# Patient Record
Sex: MALE | Race: OTHER | Employment: UNEMPLOYED | URBAN - METROPOLITAN AREA
[De-identification: names, ages, dates, MRNs, and addresses within clinical notes are randomized per-mention and may not be internally consistent; named-entity substitution may affect disease eponyms.]

---

## 2018-02-12 ENCOUNTER — OFFICE VISIT (OUTPATIENT)
Dept: FAMILY MEDICINE CLINIC | Facility: CLINIC | Age: 23
End: 2018-02-12

## 2018-02-12 VITALS
TEMPERATURE: 98 F | SYSTOLIC BLOOD PRESSURE: 117 MMHG | DIASTOLIC BLOOD PRESSURE: 77 MMHG | HEART RATE: 79 BPM | RESPIRATION RATE: 18 BRPM | HEIGHT: 69 IN | BODY MASS INDEX: 20.93 KG/M2 | WEIGHT: 141.31 LBS

## 2018-02-12 DIAGNOSIS — N50.89 SCROTAL MASS: Primary | ICD-10-CM

## 2018-02-12 DIAGNOSIS — R04.0 EPISTAXIS: ICD-10-CM

## 2018-02-12 PROCEDURE — 99213 OFFICE O/P EST LOW 20 MIN: CPT | Performed by: FAMILY MEDICINE

## 2018-02-12 PROCEDURE — 99212 OFFICE O/P EST SF 10 MIN: CPT | Performed by: FAMILY MEDICINE

## 2018-02-12 NOTE — PROGRESS NOTES
Rt sided scrotal lump 1 mo  No pain   No fever  No buring with urination      Get bloody noses a lot. Exam  Nose normal  Possible Scrotum nodule base of rt testicle.      A/p  Epistaxis ayr  Scrotal mass  u/s

## 2018-02-16 ENCOUNTER — HOSPITAL ENCOUNTER (OUTPATIENT)
Dept: ULTRASOUND IMAGING | Age: 23
Discharge: HOME | End: 2018-02-16
Attending: FAMILY MEDICINE
Payer: COMMERCIAL

## 2018-02-16 DIAGNOSIS — N50.89 SCROTAL MASS: ICD-10-CM

## 2018-02-16 PROCEDURE — 76870 US EXAM SCROTUM: CPT | Performed by: FAMILY MEDICINE

## 2018-02-16 PROCEDURE — 93975 VASCULAR STUDY: CPT | Performed by: FAMILY MEDICINE

## 2018-06-01 ENCOUNTER — OFFICE VISIT (OUTPATIENT)
Dept: FAMILY MEDICINE CLINIC | Facility: CLINIC | Age: 23
End: 2018-06-01

## 2018-06-01 VITALS
DIASTOLIC BLOOD PRESSURE: 76 MMHG | HEART RATE: 80 BPM | WEIGHT: 142 LBS | SYSTOLIC BLOOD PRESSURE: 118 MMHG | BODY MASS INDEX: 21.03 KG/M2 | HEIGHT: 69 IN

## 2018-06-01 DIAGNOSIS — F41.8 PERFORMANCE ANXIETY: ICD-10-CM

## 2018-06-01 DIAGNOSIS — R04.0 EPISTAXIS, RECURRENT: ICD-10-CM

## 2018-06-01 DIAGNOSIS — Z00.00 ROUTINE PHYSICAL EXAMINATION: Primary | ICD-10-CM

## 2018-06-01 PROCEDURE — 90471 IMMUNIZATION ADMIN: CPT | Performed by: FAMILY MEDICINE

## 2018-06-01 PROCEDURE — 90715 TDAP VACCINE 7 YRS/> IM: CPT | Performed by: FAMILY MEDICINE

## 2018-06-01 PROCEDURE — 99395 PREV VISIT EST AGE 18-39: CPT | Performed by: FAMILY MEDICINE

## 2018-06-01 RX ORDER — PROPRANOLOL HYDROCHLORIDE 40 MG/1
40 TABLET ORAL
Qty: 30 TABLET | Refills: 0 | Status: SHIPPED | OUTPATIENT
Start: 2018-06-01 | End: 2020-09-03

## 2018-06-01 NOTE — PROGRESS NOTES
Blood pressure 118/76, pulse 80, height 5' 9\" (1.753 m), weight 142 lb (64.4 kg). REASON FOR VISIT:    Kyrie Huffman is a 25year old male who presents for an [address]. There is no problem list on file for this patient.     Gene current outpatient prescriptions on file. MEDICAL INFORMATION:   History reviewed. No pertinent past medical history.    Past Surgical History:  No date: TONSILLECTOMY   Family History   Problem Relation Age of Onset   • Heart Disorder Maternal Grandmothe nerves are intact, motor and sensory are grossly intact      ASSESSMENT AND OTHER RELEVANT CHRONIC CONDITIONS:   Jenn Sinha is a 25year old male who presents for an [address].      PLAN SUMMARY:   There are no diagnoses linked to this e

## 2018-06-08 ENCOUNTER — LAB ENCOUNTER (OUTPATIENT)
Dept: LAB | Age: 23
End: 2018-06-08
Attending: FAMILY MEDICINE
Payer: COMMERCIAL

## 2018-06-08 DIAGNOSIS — Z00.00 ROUTINE PHYSICAL EXAMINATION: ICD-10-CM

## 2018-06-08 PROCEDURE — 80500 HEPATITIS A B + C PROFILE: CPT

## 2018-06-08 PROCEDURE — 36415 COLL VENOUS BLD VENIPUNCTURE: CPT

## 2018-06-08 PROCEDURE — 86803 HEPATITIS C AB TEST: CPT

## 2018-06-08 PROCEDURE — 82947 ASSAY GLUCOSE BLOOD QUANT: CPT

## 2018-06-08 PROCEDURE — 86709 HEPATITIS A IGM ANTIBODY: CPT

## 2018-06-08 PROCEDURE — 86706 HEP B SURFACE ANTIBODY: CPT

## 2018-06-08 PROCEDURE — 80061 LIPID PANEL: CPT

## 2018-06-08 PROCEDURE — 87389 HIV-1 AG W/HIV-1&-2 AB AG IA: CPT

## 2018-06-08 PROCEDURE — 86708 HEPATITIS A ANTIBODY: CPT

## 2018-06-08 PROCEDURE — 87340 HEPATITIS B SURFACE AG IA: CPT

## 2018-06-08 PROCEDURE — 86780 TREPONEMA PALLIDUM: CPT

## 2018-06-08 PROCEDURE — 87491 CHLMYD TRACH DNA AMP PROBE: CPT

## 2018-06-08 PROCEDURE — 86704 HEP B CORE ANTIBODY TOTAL: CPT

## 2018-06-08 PROCEDURE — 87591 N.GONORRHOEAE DNA AMP PROB: CPT

## 2018-06-11 ENCOUNTER — OFFICE VISIT (OUTPATIENT)
Dept: OTOLARYNGOLOGY | Facility: CLINIC | Age: 23
End: 2018-06-11

## 2018-06-11 VITALS
DIASTOLIC BLOOD PRESSURE: 76 MMHG | BODY MASS INDEX: 20.04 KG/M2 | WEIGHT: 140 LBS | HEIGHT: 70 IN | TEMPERATURE: 98 F | SYSTOLIC BLOOD PRESSURE: 118 MMHG

## 2018-06-11 DIAGNOSIS — R04.0 EPISTAXIS: Primary | ICD-10-CM

## 2018-06-11 PROCEDURE — 99212 OFFICE O/P EST SF 10 MIN: CPT | Performed by: OTOLARYNGOLOGY

## 2018-06-11 PROCEDURE — 30901 CONTROL OF NOSEBLEED: CPT | Performed by: OTOLARYNGOLOGY

## 2018-06-11 PROCEDURE — 99243 OFF/OP CNSLTJ NEW/EST LOW 30: CPT | Performed by: OTOLARYNGOLOGY

## 2018-06-11 NOTE — PROGRESS NOTES
Kelechi Alexander is a 25year old male. Patient presents with:  Nose Problem: nosebleeds from left nostril usually in the winter season     HPI:   Recurrent episodes of bleeding usually from the left side of the nose.      Current Outpatient Prescriptions: Posterior cervical. Supraclavicular.    Eyes Normal Conjunctiva - Right: Normal, Left: Normal. Pupil - Right: Normal, Left: Normal.    Ears Normal Inspection - Right: Normal, Left: Normal. Canal - Left: Normal. TM - Right: Normal, Left: Normal.     Procedur

## 2018-08-27 ENCOUNTER — HOSPITAL ENCOUNTER (OUTPATIENT)
Age: 23
Discharge: HOME | End: 2018-08-27
Attending: EMERGENCY MEDICINE
Payer: COMMERCIAL

## 2018-08-27 ENCOUNTER — NURSE TRIAGE (OUTPATIENT)
Dept: OTHER | Age: 23
End: 2018-08-27

## 2018-08-27 ENCOUNTER — APPOINTMENT (OUTPATIENT)
Dept: GENERAL RADIOLOGY | Age: 23
End: 2018-08-27
Attending: EMERGENCY MEDICINE
Payer: COMMERCIAL

## 2018-08-27 VITALS
TEMPERATURE: 99 F | WEIGHT: 135 LBS | RESPIRATION RATE: 20 BRPM | BODY MASS INDEX: 19.99 KG/M2 | HEART RATE: 78 BPM | HEIGHT: 69 IN | DIASTOLIC BLOOD PRESSURE: 66 MMHG | OXYGEN SATURATION: 100 % | SYSTOLIC BLOOD PRESSURE: 112 MMHG

## 2018-08-27 DIAGNOSIS — S93.402A MODERATE LEFT ANKLE SPRAIN, INITIAL ENCOUNTER: Primary | ICD-10-CM

## 2018-08-27 PROCEDURE — 99213 OFFICE O/P EST LOW 20 MIN: CPT

## 2018-08-27 PROCEDURE — 99203 OFFICE O/P NEW LOW 30 MIN: CPT

## 2018-08-27 PROCEDURE — 73610 X-RAY EXAM OF ANKLE: CPT | Performed by: EMERGENCY MEDICINE

## 2018-08-27 NOTE — TELEPHONE ENCOUNTER
Action Requested: Summary for Provider     []  Critical Lab, Recommendations Needed  [x] Need Additional Advice  []   FYI    []   Need Orders  [] Need Medications Sent to Pharmacy  []  Other     SUMMARY: Per pt left ankle injury x 1 day. Pain 8/10.  Radha Hebert

## 2018-08-27 NOTE — ED PROVIDER NOTES
Patient Seen in: [address]    History   Patient presents with:   Ankle Pain    Stated Complaint: ANKLE PAIN    HPI    The patient is a 51-year-old male without significant past medical history, \"rolled\" his left ankle a 1041  ------------------------------------------------------------      MDM     Patient has a moderate left ankle sprain. Will treat with immobilization and rest and have the patient follow-up with PMD for repeat evaluation and further treatment.

## 2018-11-13 ENCOUNTER — PATIENT MESSAGE (OUTPATIENT)
Dept: FAMILY MEDICINE CLINIC | Facility: CLINIC | Age: 23
End: 2018-11-13

## 2018-11-14 NOTE — TELEPHONE ENCOUNTER
From: Lu Allen  To: Henri Price DO  Sent: 11/13/2018 9:29 PM CST  Subject: Non-Urgent Isidro Palumbo,    I have recently been invited to serve in the [employer], but I still have to be medically cleared for living con

## 2018-12-13 ENCOUNTER — OFFICE VISIT (OUTPATIENT)
Dept: FAMILY MEDICINE CLINIC | Facility: CLINIC | Age: 23
End: 2018-12-13

## 2018-12-13 VITALS
WEIGHT: 136.5 LBS | SYSTOLIC BLOOD PRESSURE: 99 MMHG | DIASTOLIC BLOOD PRESSURE: 66 MMHG | HEART RATE: 65 BPM | HEIGHT: 70 IN | BODY MASS INDEX: 19.54 KG/M2

## 2018-12-13 DIAGNOSIS — Z02.1 PHYSICAL EXAM, PRE-EMPLOYMENT: Primary | ICD-10-CM

## 2018-12-13 PROCEDURE — 99395 PREV VISIT EST AGE 18-39: CPT | Performed by: FAMILY MEDICINE

## 2018-12-13 NOTE — PROGRESS NOTES
Blood pressure 99/66, pulse 65, height 5' 10\" (1.778 m), weight 136 lb 8 oz (61.9 kg). REASON FOR VISIT:    Albania Landeros is a 21year old male who presents for an [address].         Patient Active Problem List:     Physical exam, pre-e Allergies  CURRENT MEDICATIONS:     Current Outpatient Medications:  Propranolol HCl 40 MG Oral Tab Take 1 tablet (40 mg total) by mouth daily as needed.  Disp: 30 tablet Rfl: 0      MEDICAL INFORMATION:   Past Medical History:   Diagnosis Date   • Anxiety deferred  MUSCULOSKELETAL: back is not tender, FROM of the back  EXTREMITIES: no cyanosis, clubbing or edema  NEURO: Oriented times three, cranial nerves are intact, motor and sensory are grossly intact      ASSESSMENT AND OTHER RELEVANT CHRONIC CONDITIONS Future  - POLIOVIRUS (TYPES 1, 3) ANTIBODIES;  Future  - VARICELLA ZOSTER, IGG; Future  - DME GENERIC NO CHARGE PRINTABLE

## 2018-12-17 ENCOUNTER — LAB ENCOUNTER (OUTPATIENT)
Dept: LAB | Age: 23
End: 2018-12-17
Attending: FAMILY MEDICINE
Payer: COMMERCIAL

## 2018-12-17 DIAGNOSIS — Z02.1 PHYSICAL EXAM, PRE-EMPLOYMENT: ICD-10-CM

## 2018-12-17 PROCEDURE — 82955 ASSAY OF G6PD ENZYME: CPT

## 2018-12-17 PROCEDURE — 80048 BASIC METABOLIC PNL TOTAL CA: CPT

## 2018-12-17 PROCEDURE — 87340 HEPATITIS B SURFACE AG IA: CPT

## 2018-12-17 PROCEDURE — 86787 VARICELLA-ZOSTER ANTIBODY: CPT

## 2018-12-17 PROCEDURE — 86706 HEP B SURFACE ANTIBODY: CPT

## 2018-12-17 PROCEDURE — 85025 COMPLETE CBC W/AUTO DIFF WBC: CPT

## 2018-12-17 PROCEDURE — 86658 ENTEROVIRUS ANTIBODY: CPT

## 2018-12-17 PROCEDURE — 86803 HEPATITIS C AB TEST: CPT

## 2018-12-17 PROCEDURE — 87389 HIV-1 AG W/HIV-1&-2 AB AG IA: CPT

## 2018-12-17 PROCEDURE — 36415 COLL VENOUS BLD VENIPUNCTURE: CPT

## 2018-12-17 PROCEDURE — 86480 TB TEST CELL IMMUN MEASURE: CPT

## 2019-01-09 ENCOUNTER — PATIENT MESSAGE (OUTPATIENT)
Dept: FAMILY MEDICINE CLINIC | Facility: CLINIC | Age: 24
End: 2019-01-09

## 2019-01-09 NOTE — TELEPHONE ENCOUNTER
From: Kelechi Alexander  To: Rachna Muro,   Sent: 1/9/2019 11:27 AM CST  Subject: Test Results Question    Hello,    I went and got my lab work done and everything looks good, but I haven't gotten my results for the HIV and HEP tests.  Do you know if

## 2019-02-05 ENCOUNTER — PATIENT MESSAGE (OUTPATIENT)
Dept: FAMILY MEDICINE CLINIC | Facility: CLINIC | Age: 24
End: 2019-02-05

## 2019-02-06 NOTE — TELEPHONE ENCOUNTER
Patient advised to call Medical Records at [phone] for further assistance, our record does not show any childhood series of TDP.     From: Paty Ovalles  To: Tiffanie Lewis DO  Sent: 2/5/2019 10:30 PM CST  Subject: Non-Urgent Medical Question

## 2020-09-03 ENCOUNTER — TELEPHONE (OUTPATIENT)
Dept: FAMILY MEDICINE CLINIC | Facility: CLINIC | Age: 25
End: 2020-09-03

## 2020-09-03 ENCOUNTER — OFFICE VISIT (OUTPATIENT)
Dept: FAMILY MEDICINE CLINIC | Facility: CLINIC | Age: 25
End: 2020-09-03

## 2020-09-03 VITALS
HEIGHT: 70 IN | SYSTOLIC BLOOD PRESSURE: 122 MMHG | HEART RATE: 70 BPM | DIASTOLIC BLOOD PRESSURE: 76 MMHG | BODY MASS INDEX: 20.49 KG/M2 | WEIGHT: 143.13 LBS

## 2020-09-03 DIAGNOSIS — Z78.9 HX OF FOREIGN TRAVEL: ICD-10-CM

## 2020-09-03 DIAGNOSIS — Z00.00 ROUTINE PHYSICAL EXAMINATION: Primary | ICD-10-CM

## 2020-09-03 PROCEDURE — 3008F BODY MASS INDEX DOCD: CPT | Performed by: FAMILY MEDICINE

## 2020-09-03 PROCEDURE — 3078F DIAST BP <80 MM HG: CPT | Performed by: FAMILY MEDICINE

## 2020-09-03 PROCEDURE — 99395 PREV VISIT EST AGE 18-39: CPT | Performed by: FAMILY MEDICINE

## 2020-09-03 PROCEDURE — 99213 OFFICE O/P EST LOW 20 MIN: CPT | Performed by: FAMILY MEDICINE

## 2020-09-03 PROCEDURE — 3074F SYST BP LT 130 MM HG: CPT | Performed by: FAMILY MEDICINE

## 2020-09-03 NOTE — PROGRESS NOTES
REASON FOR VISIT:    Ana Laura Sanchez is a 25year old male who presents for an [address]. Patient just returned from [place]. Was working with [employer] requesting STI testing and a vasectomy.     Patient Active Problem List:     Nikki Known Allergies  CURRENT MEDICATIONS:   No current outpatient medications on file.       MEDICAL INFORMATION:   Past Medical History:   Diagnosis Date   • Anxiety       Past Surgical History:   Procedure Laterality Date   • TONSILLECTOMY        Family Histo not tender, FROM of the back  EXTREMITIES: no cyanosis, clubbing or edema  NEURO: Oriented times three, cranial nerves are intact, motor and sensory are grossly intact      ASSESSMENT AND OTHER RELEVANT CHRONIC CONDITIONS:   Effie Melissa is a 25 year ol

## 2020-09-04 ENCOUNTER — LAB ENCOUNTER (OUTPATIENT)
Dept: LAB | Age: 25
End: 2020-09-04
Attending: FAMILY MEDICINE
Payer: COMMERCIAL

## 2020-09-04 DIAGNOSIS — Z00.00 ROUTINE PHYSICAL EXAMINATION: ICD-10-CM

## 2020-09-04 DIAGNOSIS — Z78.9 HX OF FOREIGN TRAVEL: ICD-10-CM

## 2020-09-04 LAB
BASOPHILS # BLD AUTO: 0.04 X10(3) UL (ref 0–0.2)
BASOPHILS NFR BLD AUTO: 0.8 %
BILIRUB UR QL: NEGATIVE
C TRACH DNA SPEC QL NAA+PROBE: NEGATIVE
CLARITY UR: CLEAR
COLOR UR: YELLOW
DEPRECATED RDW RBC AUTO: 38.5 FL (ref 35.1–46.3)
EOSINOPHIL # BLD AUTO: 0.04 X10(3) UL (ref 0–0.7)
EOSINOPHIL NFR BLD AUTO: 0.8 %
ERYTHROCYTE [DISTWIDTH] IN BLOOD BY AUTOMATED COUNT: 11.9 % (ref 11–15)
GLUCOSE UR-MCNC: NEGATIVE MG/DL
HBV SURFACE AG SER-ACNC: <0.1 [IU]/L
HBV SURFACE AG SERPL QL IA: NONREACTIVE
HCT VFR BLD AUTO: 48.8 % (ref 39–53)
HCV AB SERPL QL IA: NONREACTIVE
HGB BLD-MCNC: 16.8 G/DL (ref 13–17.5)
HGB UR QL STRIP.AUTO: NEGATIVE
IMM GRANULOCYTES # BLD AUTO: 0 X10(3) UL (ref 0–1)
IMM GRANULOCYTES NFR BLD: 0 %
KETONES UR-MCNC: NEGATIVE MG/DL
LEUKOCYTE ESTERASE UR QL STRIP.AUTO: NEGATIVE
LYMPHOCYTES # BLD AUTO: 1.91 X10(3) UL (ref 1–4)
LYMPHOCYTES NFR BLD AUTO: 40.2 %
MCH RBC QN AUTO: 30.2 PG (ref 26–34)
MCHC RBC AUTO-ENTMCNC: 34.4 G/DL (ref 31–37)
MCV RBC AUTO: 87.6 FL (ref 80–100)
MONOCYTES # BLD AUTO: 0.46 X10(3) UL (ref 0.1–1)
MONOCYTES NFR BLD AUTO: 9.7 %
N GONORRHOEA DNA SPEC QL NAA+PROBE: NEGATIVE
NEUTROPHILS # BLD AUTO: 2.3 X10 (3) UL (ref 1.5–7.7)
NEUTROPHILS # BLD AUTO: 2.3 X10(3) UL (ref 1.5–7.7)
NEUTROPHILS NFR BLD AUTO: 48.5 %
NITRITE UR QL STRIP.AUTO: NEGATIVE
PH UR: 7 [PH] (ref 5–8)
PLATELET # BLD AUTO: 184 10(3)UL (ref 150–450)
PROT UR-MCNC: NEGATIVE MG/DL
RBC # BLD AUTO: 5.57 X10(6)UL (ref 4.3–5.7)
SARS-COV-2 IGG SERPLBLD QL IA.RAPID: NEGATIVE
SP GR UR STRIP: 1.01 (ref 1–1.03)
UROBILINOGEN UR STRIP-ACNC: <2
WBC # BLD AUTO: 4.8 X10(3) UL (ref 4–11)

## 2020-09-04 PROCEDURE — 87591 N.GONORRHOEAE DNA AMP PROB: CPT

## 2020-09-04 PROCEDURE — 86803 HEPATITIS C AB TEST: CPT

## 2020-09-04 PROCEDURE — 86769 SARS-COV-2 COVID-19 ANTIBODY: CPT

## 2020-09-04 PROCEDURE — 86480 TB TEST CELL IMMUN MEASURE: CPT

## 2020-09-04 PROCEDURE — 85025 COMPLETE CBC W/AUTO DIFF WBC: CPT

## 2020-09-04 PROCEDURE — 87491 CHLMYD TRACH DNA AMP PROBE: CPT

## 2020-09-04 PROCEDURE — 87389 HIV-1 AG W/HIV-1&-2 AB AG IA: CPT

## 2020-09-04 PROCEDURE — 36415 COLL VENOUS BLD VENIPUNCTURE: CPT

## 2020-09-04 PROCEDURE — 81003 URINALYSIS AUTO W/O SCOPE: CPT

## 2020-09-04 PROCEDURE — 87340 HEPATITIS B SURFACE AG IA: CPT

## 2020-09-04 PROCEDURE — 86780 TREPONEMA PALLIDUM: CPT

## 2020-09-06 LAB
M TB IFN-G CD4+ T-CELLS BLD-ACNC: 0.02 IU/ML
M TB TUBERC IFN-G BLD QL: NEGATIVE
M TB TUBERC IGNF/MITOGEN IGNF CONTROL: 9.2 IU/ML
QUANTIFERON TB1 MINUS NIL: 0 IU/ML
QUANTIFERON TB2 MINUS NIL: 0 IU/ML

## 2020-09-07 LAB — T PALLIDUM AB SER QL: NEGATIVE

## 2020-09-21 ENCOUNTER — TELEPHONE (OUTPATIENT)
Dept: FAMILY MEDICINE CLINIC | Facility: CLINIC | Age: 25
End: 2020-09-21

## 2020-12-23 ENCOUNTER — LAB ENCOUNTER (OUTPATIENT)
Dept: LAB | Age: 25
End: 2020-12-23
Attending: FAMILY MEDICINE
Payer: COMMERCIAL

## 2020-12-23 DIAGNOSIS — Z11.3 SCREEN FOR STD (SEXUALLY TRANSMITTED DISEASE): ICD-10-CM

## 2020-12-23 PROCEDURE — 87591 N.GONORRHOEAE DNA AMP PROB: CPT

## 2020-12-23 PROCEDURE — 86780 TREPONEMA PALLIDUM: CPT

## 2020-12-23 PROCEDURE — 87491 CHLMYD TRACH DNA AMP PROBE: CPT

## 2020-12-23 PROCEDURE — 87389 HIV-1 AG W/HIV-1&-2 AB AG IA: CPT

## 2020-12-23 PROCEDURE — 87340 HEPATITIS B SURFACE AG IA: CPT

## 2020-12-23 PROCEDURE — 36415 COLL VENOUS BLD VENIPUNCTURE: CPT

## 2020-12-23 PROCEDURE — 86803 HEPATITIS C AB TEST: CPT

## 2021-03-03 ENCOUNTER — NURSE TRIAGE (OUTPATIENT)
Dept: FAMILY MEDICINE CLINIC | Facility: CLINIC | Age: 26
End: 2021-03-03

## 2021-03-03 NOTE — TELEPHONE ENCOUNTER
Action Requested: Summary for Provider     []  Critical Lab, Recommendations Needed  [] Need Additional Advice  []   FYI    []   Need Orders  [] Need Medications Sent to Pharmacy  []  Other     SUMMARY: patient has an appointment for 3/4/21.     Reports anx

## 2021-03-04 ENCOUNTER — TELEMEDICINE (OUTPATIENT)
Dept: FAMILY MEDICINE CLINIC | Facility: CLINIC | Age: 26
End: 2021-03-04

## 2021-03-04 DIAGNOSIS — F32.A DEPRESSION, UNSPECIFIED DEPRESSION TYPE: Primary | ICD-10-CM

## 2021-03-04 PROCEDURE — 99213 OFFICE O/P EST LOW 20 MIN: CPT | Performed by: FAMILY MEDICINE

## 2021-03-04 RX ORDER — ESCITALOPRAM OXALATE 10 MG/1
10 TABLET ORAL DAILY
Qty: 30 TABLET | Refills: 2 | Status: SHIPPED | OUTPATIENT
Start: 2021-03-04 | End: 2021-05-18

## 2021-03-04 NOTE — PROGRESS NOTES
There were no vitals taken for this visit. VIDEO VISIT      Patient presents today complaining of depression. Reports he feels he has always had seasonal affective disorder. He denies any suicidal intent. He does not abuse drugs.   He is currently not

## 2021-03-11 ENCOUNTER — TELEMEDICINE (OUTPATIENT)
Dept: FAMILY MEDICINE CLINIC | Facility: CLINIC | Age: 26
End: 2021-03-11

## 2021-03-11 DIAGNOSIS — F32.A DEPRESSION, UNSPECIFIED DEPRESSION TYPE: Primary | ICD-10-CM

## 2021-03-11 PROCEDURE — 99213 OFFICE O/P EST LOW 20 MIN: CPT | Performed by: FAMILY MEDICINE

## 2021-03-11 NOTE — PROGRESS NOTES
VIDEO VISIT    Patient presents today following up for depression. Sleeping well. Had headaches initially with the medication but none at this time. Some erectile dysfunction. Has noticed some yawning. No suicidal intent. Does not use drugs.   Denies

## 2021-03-14 ENCOUNTER — APPOINTMENT (OUTPATIENT)
Dept: GENERAL RADIOLOGY | Facility: HOSPITAL | Age: 26
End: 2021-03-14
Attending: EMERGENCY MEDICINE
Payer: COMMERCIAL

## 2021-03-14 ENCOUNTER — HOSPITAL ENCOUNTER (EMERGENCY)
Facility: HOSPITAL | Age: 26
Discharge: HOME | End: 2021-03-14
Attending: EMERGENCY MEDICINE
Payer: COMMERCIAL

## 2021-03-14 VITALS
OXYGEN SATURATION: 98 % | HEART RATE: 67 BPM | TEMPERATURE: 98 F | DIASTOLIC BLOOD PRESSURE: 97 MMHG | RESPIRATION RATE: 18 BRPM | HEIGHT: 69 IN | WEIGHT: 135 LBS | SYSTOLIC BLOOD PRESSURE: 127 MMHG | BODY MASS INDEX: 19.99 KG/M2

## 2021-03-14 DIAGNOSIS — S59.902A INJURY OF LEFT ELBOW, INITIAL ENCOUNTER: Primary | ICD-10-CM

## 2021-03-14 PROCEDURE — 29125 APPL SHORT ARM SPLINT STATIC: CPT

## 2021-03-14 PROCEDURE — 99283 EMERGENCY DEPT VISIT LOW MDM: CPT

## 2021-03-14 PROCEDURE — 73080 X-RAY EXAM OF ELBOW: CPT | Performed by: EMERGENCY MEDICINE

## 2021-03-14 RX ORDER — IBUPROFEN 600 MG/1
600 TABLET ORAL ONCE
Status: COMPLETED | OUTPATIENT
Start: 2021-03-14 | End: 2021-03-14

## 2021-03-14 NOTE — ED PROVIDER NOTES
Patient Seen in: [facility]      History   Patient presents with:  Elbow Injury    Stated Complaint: left elbow injury    HPI/Subjective:   HPI    31-year-old male with history of anxiety presents with complaints of left elbow shoulder tenderness noted. No distal forearm, wrist, or hand tenderness noted. Bilateral radial pulses intact and symmetric. Capillary refill less than 2 seconds to the fingertips.   No other joint tenderness is noted to palpation or range of motion of t

## 2021-03-14 NOTE — ED INITIAL ASSESSMENT (HPI)
Patient complaining of left elbow pain after a fall yesterday while skateboarding. States he had full range of motion immediately after the fall but now feels \"it has tightened up. \"

## 2021-03-15 ENCOUNTER — TELEMEDICINE (OUTPATIENT)
Dept: FAMILY MEDICINE CLINIC | Facility: CLINIC | Age: 26
End: 2021-03-15

## 2021-03-15 DIAGNOSIS — S59.909D ELBOW INJURY, UNSPECIFIED LATERALITY, SUBSEQUENT ENCOUNTER: Primary | ICD-10-CM

## 2021-03-15 PROBLEM — Z02.1 PHYSICAL EXAM, PRE-EMPLOYMENT: Status: RESOLVED | Noted: 2018-06-01 | Resolved: 2021-03-15

## 2021-03-15 PROBLEM — F33.1 MAJOR DEPRESSIVE DISORDER, RECURRENT, MODERATE (HCC): Status: ACTIVE | Noted: 2021-03-15

## 2021-03-15 PROCEDURE — 99213 OFFICE O/P EST LOW 20 MIN: CPT | Performed by: FAMILY MEDICINE

## 2021-03-15 RX ORDER — HYDROCODONE BITARTRATE AND ACETAMINOPHEN 5; 325 MG/1; MG/1
1 TABLET ORAL EVERY 6 HOURS PRN
Qty: 20 TABLET | Refills: 0 | Status: SHIPPED | OUTPATIENT
Start: 2021-03-15 | End: 2021-03-24

## 2021-03-15 NOTE — PROGRESS NOTES
VIDEO VISIT    Left elbow injury. Fell while skateboarding 2 days ago. Went to the ER yesterday. No pain medication was given. X-rays were nondiagnostic.     Objective large splint noted left upper extremity    Patient able to spread fingers apart    Ab

## 2021-03-17 ENCOUNTER — TELEPHONE (OUTPATIENT)
Dept: FAMILY MEDICINE CLINIC | Facility: CLINIC | Age: 26
End: 2021-03-17

## 2021-03-17 NOTE — TELEPHONE ENCOUNTER
Patient is requesting a referral for an orthopedic per his visit with Dr. Rut Poon on 3/15, please advise

## 2021-03-18 ENCOUNTER — TELEMEDICINE (OUTPATIENT)
Dept: FAMILY MEDICINE CLINIC | Facility: CLINIC | Age: 26
End: 2021-03-18

## 2021-03-18 DIAGNOSIS — S49.92XD ARM INJURY, LEFT, SUBSEQUENT ENCOUNTER: Primary | ICD-10-CM

## 2021-03-18 PROCEDURE — 99213 OFFICE O/P EST LOW 20 MIN: CPT | Performed by: FAMILY MEDICINE

## 2021-03-18 NOTE — PROGRESS NOTES
VIDEO VISIT    Patient presents today following up for depression. No recent panic attacks no suicidal thoughts in the past several days. Feels better on the medication sleeping better at night. Pain is diminished in the left arm.     Patient has met wit

## 2021-03-24 ENCOUNTER — OFFICE VISIT (OUTPATIENT)
Dept: ORTHOPEDICS CLINIC | Facility: CLINIC | Age: 26
End: 2021-03-24

## 2021-03-24 VITALS — BODY MASS INDEX: 19.99 KG/M2 | HEIGHT: 69 IN | WEIGHT: 135 LBS

## 2021-03-24 DIAGNOSIS — M25.422 EFFUSION, LEFT ELBOW: Primary | ICD-10-CM

## 2021-03-24 PROCEDURE — 3008F BODY MASS INDEX DOCD: CPT | Performed by: ORTHOPAEDIC SURGERY

## 2021-03-24 PROCEDURE — 99243 OFF/OP CNSLTJ NEW/EST LOW 30: CPT | Performed by: ORTHOPAEDIC SURGERY

## 2021-03-24 NOTE — PROGRESS NOTES
NURSING INTAKE COMMENTS: Patient presents with:  Consult: left arm injury- pt states he fell off his skateboard on 3/13/21 and fell on his wrist. pt states his pain is in the forearm and elbow.  pt went to [facility] and had XR of elbow on 3/14/21      HPI: This 25 nasal congestion, sinus pain or ST  LUNGS: denies shortness of breath  CARDIOVASCULAR: denies chest pain  GI: no hematemesis, no worsening heartburn, no diarrhea  : no dysuria, no blood in urine, no difficulty urinating, no incontinence  MUSCULOSKELETAL: 184.0 09/04/2020      Lab Results   Component Value Date    GLU 92 12/17/2018    BUN 12 12/17/2018    CREATSERUM 0.95 12/17/2018    GFRNAA >60 12/17/2018    GFRAA >60 12/17/2018        Assessment and Plan:  Diagnoses and all orders for this visit:    Lashae Villeda

## 2021-03-25 RX ORDER — HYDROCODONE BITARTRATE AND ACETAMINOPHEN 5; 325 MG/1; MG/1
1 TABLET ORAL EVERY 6 HOURS PRN
Qty: 20 TABLET | Refills: 0 | Status: SHIPPED | OUTPATIENT
Start: 2021-03-25 | End: 2021-04-22

## 2021-05-18 RX ORDER — ESCITALOPRAM OXALATE 10 MG/1
10 TABLET ORAL DAILY
Qty: 30 TABLET | Refills: 0 | Status: SHIPPED | OUTPATIENT
Start: 2021-05-18 | End: 2021-05-21

## 2021-06-14 PROBLEM — F33.1 MAJOR DEPRESSIVE DISORDER, RECURRENT, MODERATE (HCC): Status: RESOLVED | Noted: 2021-03-15 | Resolved: 2021-06-14

## 2021-06-14 PROBLEM — F33.40 SEASONAL AFFECTIVE DISORDER IN REMISSION (HCC): Status: ACTIVE | Noted: 2021-06-14

## 2021-11-16 ENCOUNTER — PATIENT MESSAGE (OUTPATIENT)
Dept: FAMILY MEDICINE CLINIC | Facility: CLINIC | Age: 26
End: 2021-11-16

## 2021-11-16 NOTE — TELEPHONE ENCOUNTER
From: Paty Ovalles  To: Gonzalez Hoover,   Sent: 11/16/2021 11:11 AM CST  Subject: STD tests    Kati Hoyt,     I was wondering if you could put in an order/I could come in for a regular panel of STD tests at the [facility].      Thanks

## 2021-11-17 NOTE — TELEPHONE ENCOUNTER
From: Boo Feliz  To: Sudha Hoover DO  Sent: 11/16/2021 11:11 AM CST  Subject: STD tests    Kati Ferrell,     I was wondering if you could put in an order/I could come in for a regular panel of STD tests at the [address] [place] [address].      Thanks

## 2021-12-02 ENCOUNTER — TELEPHONE (OUTPATIENT)
Dept: FAMILY MEDICINE CLINIC | Facility: CLINIC | Age: 26
End: 2021-12-02

## 2021-12-06 NOTE — TELEPHONE ENCOUNTER
Spoke to patient and he states he looked at his mychart and is able to use the ones he had in the past

## 2021-12-15 ENCOUNTER — TELEPHONE (OUTPATIENT)
Dept: FAMILY MEDICINE CLINIC | Facility: CLINIC | Age: 26
End: 2021-12-15

## 2021-12-15 NOTE — TELEPHONE ENCOUNTER
Patient needs his 3rd round of the TDAP vaccine for school. Please advise on order to schedule patient.

## 2021-12-15 NOTE — TELEPHONE ENCOUNTER
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 Patient states he has no record of receiving Dtap as a child and therefore needs to provide 3 doses of Tdap.     Please sign off on TDAP order    Patient has nurse visit appt tomorrow 12/16 at 1 pm

## 2021-12-16 ENCOUNTER — NURSE ONLY (OUTPATIENT)
Dept: FAMILY MEDICINE CLINIC | Facility: CLINIC | Age: 26
End: 2021-12-16
Payer: MEDICAID

## 2021-12-16 DIAGNOSIS — Z23 NEED FOR VACCINATION: Primary | ICD-10-CM

## 2021-12-16 PROCEDURE — 90715 TDAP VACCINE 7 YRS/> IM: CPT | Performed by: FAMILY MEDICINE

## 2021-12-16 PROCEDURE — 90471 IMMUNIZATION ADMIN: CPT | Performed by: FAMILY MEDICINE

## 2021-12-18 ENCOUNTER — OFFICE VISIT (OUTPATIENT)
Dept: FAMILY MEDICINE CLINIC | Facility: CLINIC | Age: 26
End: 2021-12-18
Payer: MEDICAID

## 2021-12-18 VITALS
DIASTOLIC BLOOD PRESSURE: 67 MMHG | HEIGHT: 69 IN | SYSTOLIC BLOOD PRESSURE: 101 MMHG | WEIGHT: 149 LBS | HEART RATE: 102 BPM | BODY MASS INDEX: 22.07 KG/M2

## 2021-12-18 DIAGNOSIS — Z30.09 VASECTOMY EVALUATION: ICD-10-CM

## 2021-12-18 DIAGNOSIS — Z00.00 ROUTINE PHYSICAL EXAMINATION: Primary | ICD-10-CM

## 2021-12-18 DIAGNOSIS — F32.A DEPRESSION, UNSPECIFIED DEPRESSION TYPE: ICD-10-CM

## 2021-12-18 DIAGNOSIS — F33.40 SEASONAL AFFECTIVE DISORDER IN REMISSION (HCC): ICD-10-CM

## 2021-12-18 PROCEDURE — 3078F DIAST BP <80 MM HG: CPT | Performed by: FAMILY MEDICINE

## 2021-12-18 PROCEDURE — 99395 PREV VISIT EST AGE 18-39: CPT | Performed by: FAMILY MEDICINE

## 2021-12-18 PROCEDURE — 3074F SYST BP LT 130 MM HG: CPT | Performed by: FAMILY MEDICINE

## 2021-12-18 PROCEDURE — 3008F BODY MASS INDEX DOCD: CPT | Performed by: FAMILY MEDICINE

## 2021-12-18 NOTE — PROGRESS NOTES
Blood pressure 101/67, pulse 102, height 5' 9\" (1.753 m), weight 149 lb (67.6 kg). Patient presents today following up for physical.  History of depression no suicidal thoughts. 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   REASON FOR VISIT:    Leola Tompkins No results found for: RPR   Hepatitis C Screening Screen pts at high risk plus screen one time for adults born [address] [facility]  Hepatitis C Virus (no units)   Date Value   12/23/2020 Nonreactive       Tuberculosis Screen If high risk No components found for: PP depression or anxiety  HEMATOLOGIC: denies hx of anemia  ENDOCRINE: denies thyroid history  ALL/ASTHMA: denies hx of allergy or asthma  NO BLOOD IN STOOL  EXAM:   /67 (BP Location: Left arm, Patient Position: Sitting)   Pulse 102   Ht 5' 9\" (1.753 m time  HPV: HPV Vaccines(1 - Male 2-dose series) Never done  Tdap: No recommendations at this time  Shingles:      Influenza Annually   Pneumococcal if high risk   Td/Tdap once then every 10 years   HPV Males 11-21   Zoster (Shingles) 60 and older: one dose

## 2022-03-01 ENCOUNTER — TELEPHONE (OUTPATIENT)
Dept: SURGERY | Facility: CLINIC | Age: 27
End: 2022-03-01

## 2022-03-02 ENCOUNTER — OFFICE VISIT (OUTPATIENT)
Dept: SURGERY | Facility: CLINIC | Age: 27
End: 2022-03-02
Payer: MEDICAID

## 2022-03-02 VITALS — DIASTOLIC BLOOD PRESSURE: 83 MMHG | SYSTOLIC BLOOD PRESSURE: 122 MMHG | HEART RATE: 90 BPM

## 2022-03-02 DIAGNOSIS — Z30.09 VASECTOMY EVALUATION: Primary | ICD-10-CM

## 2022-03-02 DIAGNOSIS — N50.9 SCROTAL SKIN LESION: ICD-10-CM

## 2022-03-02 PROCEDURE — 3079F DIAST BP 80-89 MM HG: CPT | Performed by: UROLOGY

## 2022-03-02 PROCEDURE — 99244 OFF/OP CNSLTJ NEW/EST MOD 40: CPT | Performed by: UROLOGY

## 2022-03-02 PROCEDURE — 3074F SYST BP LT 130 MM HG: CPT | Performed by: UROLOGY

## 2022-03-02 RX ORDER — HYDROCODONE BITARTRATE AND ACETAMINOPHEN 5; 325 MG/1; MG/1
2 TABLET ORAL
Qty: 2 TABLET | Refills: 0 | Status: SHIPPED | OUTPATIENT
Start: 2022-03-02 | End: 2022-03-02

## 2022-03-02 RX ORDER — DIAZEPAM 10 MG/1
10 TABLET ORAL ONCE
Qty: 1 TABLET | Refills: 0 | Status: SHIPPED | OUTPATIENT
Start: 2022-03-02 | End: 2022-03-02

## 2022-03-03 ENCOUNTER — PATIENT MESSAGE (OUTPATIENT)
Dept: FAMILY MEDICINE CLINIC | Facility: CLINIC | Age: 27
End: 2022-03-03

## 2022-03-03 ENCOUNTER — TELEPHONE (OUTPATIENT)
Dept: SURGERY | Facility: CLINIC | Age: 27
End: 2022-03-03

## 2022-03-03 RX ORDER — TADALAFIL 5 MG/1
5 TABLET ORAL
Qty: 30 TABLET | Refills: 5 | Status: SHIPPED | OUTPATIENT
Start: 2022-03-03

## 2022-03-03 RX ORDER — BUPROPION HYDROCHLORIDE 150 MG/1
150 TABLET ORAL DAILY
Qty: 90 TABLET | Refills: 1 | Status: SHIPPED | OUTPATIENT
Start: 2022-03-03

## 2022-03-03 NOTE — TELEPHONE ENCOUNTER
From: Camilla Kothari  To: Vega Suresh,   Sent: 3/3/2022 1:18 PM CST  Subject: Rx     Hello Dr. Shannan Suresh,    My insurance changed and now the original prescriber for my tadalafil and bupropion Rx is no longer in network and I think it is causing issues with the pharmacy/refilling the presciption. I was wondering if you could take over the prescriber role for the medications so that they can be refilled. I think you'll be able to see it on my profile but if not, it was 5mg as needed daily for the tadalafil, and bupropion xl 150 mg tablets, once daily.     Thanks,   Kaycee Torres

## 2022-03-07 ENCOUNTER — TELEPHONE (OUTPATIENT)
Dept: FAMILY MEDICINE CLINIC | Facility: CLINIC | Age: 27
End: 2022-03-07

## 2022-03-07 NOTE — TELEPHONE ENCOUNTER
Prior authorization for tadalafil was done through [employer].  It can take 1-5 business days for a decision to come back

## 2022-05-19 RX ORDER — TADALAFIL 5 MG/1
5 TABLET ORAL
Qty: 30 TABLET | Refills: 5 | Status: SHIPPED | OUTPATIENT
Start: 2022-05-19

## 2022-05-19 NOTE — TELEPHONE ENCOUNTER
Refill passed per [facility] protocol. Requested Prescriptions   Pending Prescriptions Disp Refills    tadalafil 5 MG Oral Tab 30 tablet 5     Sig: Take 1 tablet (5 mg total) by mouth daily as needed for Erectile Dysfunction.         Genitourinary Medications Passed - 5/18/2022 11:48 PM        Passed - Patient does not have pulmonary hypertension on problem list        Passed - Appointment in the past 12 or next 3 months               Future Appointments         Provider Department Appt Notes    In 2 months Clark Harp MD [facility], [place], [place] VAS/penile Lesion removal             Recent Outpatient Visits              2 months ago Vasectomy evaluation    [facility]    Office Visit    5 months ago Routine physical examination    [address], [address], DO    Office Visit    5 months ago Need for vaccination    [address] [facility]    Nurse Only    1 year ago Effusion, left elbow    [facility] for [place] MD    Office Visit    1 year ago Arm injury, left, subsequent encounter    [facility], Manpreet CARMEN, DO    Telemedicine

## 2022-06-08 ENCOUNTER — TELEPHONE (OUTPATIENT)
Dept: SURGERY | Facility: CLINIC | Age: 27
End: 2022-06-08

## 2022-06-08 DIAGNOSIS — Z30.09 VASECTOMY EVALUATION: Primary | ICD-10-CM

## 2022-06-08 RX ORDER — HYDROCODONE BITARTRATE AND ACETAMINOPHEN 5; 325 MG/1; MG/1
1 TABLET ORAL ONCE
Qty: 2 TABLET | Refills: 0 | Status: CANCELLED | OUTPATIENT
Start: 2022-06-08 | End: 2022-06-08

## 2022-06-08 RX ORDER — DIAZEPAM 10 MG/1
TABLET ORAL
COMMUNITY
Start: 2022-03-02

## 2022-06-08 RX ORDER — HYDROCODONE BITARTRATE AND ACETAMINOPHEN 5; 325 MG/1; MG/1
TABLET ORAL
COMMUNITY
Start: 2022-03-03

## 2022-06-08 RX ORDER — DIAZEPAM 10 MG/1
10 TABLET ORAL ONCE
Qty: 1 TABLET | Refills: 0 | Status: SHIPPED | OUTPATIENT
Start: 2022-06-08 | End: 2022-06-08

## 2022-06-08 RX ORDER — DIAZEPAM 10 MG/1
10 TABLET ORAL ONCE
Qty: 1 TABLET | Refills: 0 | Status: CANCELLED | OUTPATIENT
Start: 2022-06-08 | End: 2022-06-08

## 2022-06-08 RX ORDER — HYDROCODONE BITARTRATE AND ACETAMINOPHEN 5; 325 MG/1; MG/1
2 TABLET ORAL
Qty: 2 TABLET | Refills: 0 | Status: SHIPPED | OUTPATIENT
Start: 2022-06-08 | End: 2022-06-08

## 2022-06-08 NOTE — TELEPHONE ENCOUNTER
- S/w pt. States he needs a PA for the norco. I informed him that we will not be able to get a PA prior to his procedure tomorrow. I instructed him to use [employer] to  the norco for $3.44 at [employer].  - States he has norco from earlier this year from when he broke his elbow. He asked if he could just bring that. I verified with that it is the same dose: - Hydrocodone-acetaminophen 5-325 mg.  - I verified that it was not . I informed him to bring them with him to the procedure and he will be instructed when to take it in the office. Instructed him to bring them in the prescription bottle so that we can see the dose.

## 2022-06-08 NOTE — TELEPHONE ENCOUNTER
Per patient confirmed appointment for tomorrow. PT to arrive at 10:30am for 11 am procedure. Original order for pretreatment meds . Orders pended. Please advise.

## 2022-06-08 NOTE — TELEPHONE ENCOUNTER
Spoke with patient. Per Diomedes Marvin would like us to reach out to patient on the wait list to see if they can come in sooner for procedure. PT states he will need to check if he has a ride and will call back office to confirm. PT states he has not taken any aspirin or nsaids in the last 7-10 days. GREER's if patient calls back, please transfer to uro staff ext: [phone]. Per patient did not  pre treatment medications. Orders pended.  Please advise       Date Time Provider Maksim Pandey   6/9/2022 11:00 AM Cornelia Gale MD [facility]

## 2022-06-09 ENCOUNTER — PROCEDURE (OUTPATIENT)
Dept: SURGERY | Facility: CLINIC | Age: 27
End: 2022-06-09
Payer: MEDICAID

## 2022-06-09 VITALS
RESPIRATION RATE: 17 BRPM | HEIGHT: 69 IN | HEART RATE: 70 BPM | BODY MASS INDEX: 22.07 KG/M2 | WEIGHT: 149 LBS | SYSTOLIC BLOOD PRESSURE: 100 MMHG | DIASTOLIC BLOOD PRESSURE: 72 MMHG

## 2022-06-09 DIAGNOSIS — Z30.8 POSTVASECTOMY SPERM COUNT: ICD-10-CM

## 2022-06-09 DIAGNOSIS — N50.9 SCROTAL SKIN LESION: ICD-10-CM

## 2022-06-09 DIAGNOSIS — Z30.2 ENCOUNTER FOR STERILIZATION: Primary | ICD-10-CM

## 2022-06-09 PROCEDURE — 3074F SYST BP LT 130 MM HG: CPT | Performed by: UROLOGY

## 2022-06-09 PROCEDURE — 3008F BODY MASS INDEX DOCD: CPT | Performed by: UROLOGY

## 2022-06-09 PROCEDURE — 3078F DIAST BP <80 MM HG: CPT | Performed by: UROLOGY

## 2022-06-09 PROCEDURE — 55250 REMOVAL OF SPERM DUCT(S): CPT | Performed by: UROLOGY

## 2022-06-09 NOTE — PROCEDURES
UROLOGY PROCEDURE NOTE  NO-SCALPEL BILATERAL VASECTOMY      PREOPERATIVE DIAGNOSIS: Desires voluntary sterilization - bilateral vasectomy. POSTOPERATIVE DIAGNOSIS: Desires voluntary sterilization - bilateral vasectomy. PROCEDURE PERFORMED: Voluntary sterilization - bilateral no-scalpel vasectomy. ANESTHESIA: Diazepam 10 mg orally and 2 pills of hydrocodone 5/325 mg orally and 2% Xylocaine injectable. INDICATIONS:   Before surgery today, I once again discussed  with the patient the following issues, complications, side effects of vasectomy: possible failure of the procedure with possibility that patient could still end up making his wife pregnant, despite undergoing this procedure; bleeding complications; wound infection; pain that might never go away; the fact that he is still fertile immediately after the procedure; that if he changes his mind, a reversal operation may not  be successful; as well as other side effects and complications and the patient understands and still wishes to proceed and feels comfortable with his decision. DESCRIPTION OF THE PROCEDURE:      The patient was given the anesthesia as indicated above. He was prepped and draped in usual sterile fashion. The right vas deferens was isolated under the scrotal skin. The skin over it was infiltrated with 2% Plain Xylocaine. A small opening was made in the skin over the vas; the vas was delivered out of the wound; it was skeletonized and exposed for a 3-4 cm segment. Hemostasis was controlled by electrocoagulation. A 1 cm segment was excised. The ends were fulgurated. One medium clip was placed on the testicular side and one medium clip was placed on the abdominal side. The ends were then allowed to fall back into the wound. The exact same procedure was performed on the contralateral side through the same skin opening. At the end of the procedure, the skin was approximated using Skin Affix topical skin adhesive.   Each segment of vas deferens was sent separately in formalin to pathology for identification. The patient tolerated the procedure well. Postprocedural care and follow-up instructions provided to patient.     Fuad Donald MD  06/09/22

## 2022-08-15 ENCOUNTER — LAB ENCOUNTER (OUTPATIENT)
Dept: LAB | Facility: HOSPITAL | Age: 27
End: 2022-08-15
Attending: UROLOGY
Payer: MEDICAID

## 2022-08-15 DIAGNOSIS — Z30.8 POSTVASECTOMY SPERM COUNT: ICD-10-CM

## 2022-08-15 PROCEDURE — 89321 SEMEN ANAL SPERM DETECTION: CPT

## 2022-10-25 ENCOUNTER — OFFICE VISIT (OUTPATIENT)
Dept: SURGERY | Facility: CLINIC | Age: 27
End: 2022-10-25
Payer: MEDICAID

## 2022-10-25 DIAGNOSIS — Z98.52 S/P VASECTOMY: Primary | ICD-10-CM

## 2022-10-25 PROCEDURE — 99024 POSTOP FOLLOW-UP VISIT: CPT | Performed by: NURSE PRACTITIONER

## 2022-11-14 ENCOUNTER — LAB ENCOUNTER (OUTPATIENT)
Dept: LAB | Facility: HOSPITAL | Age: 27
End: 2022-11-14
Attending: NURSE PRACTITIONER
Payer: MEDICAID

## 2022-11-14 DIAGNOSIS — Z98.52 S/P VASECTOMY: ICD-10-CM

## 2022-11-14 PROCEDURE — 89321 SEMEN ANAL SPERM DETECTION: CPT

## 2023-02-27 RX ORDER — TADALAFIL 5 MG/1
5 TABLET ORAL
Qty: 30 TABLET | Refills: 0 | Status: SHIPPED | OUTPATIENT
Start: 2023-02-27

## 2023-02-27 NOTE — TELEPHONE ENCOUNTER
Refill passed per [place] protocol    Requested Prescriptions   Pending Prescriptions Disp Refills    tadalafil 5 MG Oral Tab 30 tablet 5     Sig: Take 1 tablet (5 mg total) by mouth daily as needed for Erectile Dysfunction.        Genitourinary Medications Passed - 2/25/2023  7:57 PM        Passed - Patient does not have pulmonary hypertension on problem list        Passed - In person appointment or virtual visit in the past 12 mos or appointment in next 3 mos     Recent Outpatient Visits              4 months ago S/P vasectomy    [address] [facility], [employer], [address], APRN    Office Visit    12 months ago Vasectomy evaluation    Kasia Marcus MD    Office Visit    1 year ago Routine physical examination    [facility], [facility], Kvng Horowitz DO    Office Visit    1 year ago Need for vaccination    [facility], [address] [place] [address], [place]    Nurse Only    1 year ago Effusion, left elbow    [facility], [address] [facility], Anita Patel MD    Office Visit          Future Appointments         Provider Department Appt Notes    In 1 month Ferny DO [facility] Victor [address] [place] [address], [employer] Physical

## 2023-03-15 ENCOUNTER — OFFICE VISIT (OUTPATIENT)
Dept: FAMILY MEDICINE CLINIC | Facility: CLINIC | Age: 28
End: 2023-03-15

## 2023-03-15 ENCOUNTER — LAB ENCOUNTER (OUTPATIENT)
Dept: LAB | Age: 28
End: 2023-03-15
Attending: FAMILY MEDICINE
Payer: MEDICAID

## 2023-03-15 VITALS
HEIGHT: 69 IN | SYSTOLIC BLOOD PRESSURE: 99 MMHG | DIASTOLIC BLOOD PRESSURE: 65 MMHG | HEART RATE: 65 BPM | WEIGHT: 144 LBS | BODY MASS INDEX: 21.33 KG/M2

## 2023-03-15 DIAGNOSIS — F41.8 PERFORMANCE ANXIETY: ICD-10-CM

## 2023-03-15 DIAGNOSIS — Z00.00 ROUTINE PHYSICAL EXAMINATION: Primary | ICD-10-CM

## 2023-03-15 DIAGNOSIS — F32.A DEPRESSION, UNSPECIFIED DEPRESSION TYPE: ICD-10-CM

## 2023-03-15 DIAGNOSIS — Z00.00 ROUTINE PHYSICAL EXAMINATION: ICD-10-CM

## 2023-03-15 PROCEDURE — 36415 COLL VENOUS BLD VENIPUNCTURE: CPT

## 2023-03-15 PROCEDURE — 3078F DIAST BP <80 MM HG: CPT | Performed by: FAMILY MEDICINE

## 2023-03-15 PROCEDURE — 3008F BODY MASS INDEX DOCD: CPT | Performed by: FAMILY MEDICINE

## 2023-03-15 PROCEDURE — 3074F SYST BP LT 130 MM HG: CPT | Performed by: FAMILY MEDICINE

## 2023-03-15 PROCEDURE — 86480 TB TEST CELL IMMUN MEASURE: CPT

## 2023-03-15 PROCEDURE — 99395 PREV VISIT EST AGE 18-39: CPT | Performed by: FAMILY MEDICINE

## 2023-03-17 LAB
M TB IFN-G CD4+ T-CELLS BLD-ACNC: 0.05 IU/ML
M TB TUBERC IFN-G BLD QL: NEGATIVE
M TB TUBERC IGNF/MITOGEN IGNF CONTROL: >10 IU/ML
QFT TB1 AG MINUS NIL: -0.01 IU/ML
QFT TB2 AG MINUS NIL: 0.01 IU/ML

## 2023-05-26 ENCOUNTER — OFFICE VISIT (OUTPATIENT)
Dept: FAMILY MEDICINE CLINIC | Facility: CLINIC | Age: 28
End: 2023-05-26

## 2023-05-26 VITALS
DIASTOLIC BLOOD PRESSURE: 56 MMHG | SYSTOLIC BLOOD PRESSURE: 96 MMHG | WEIGHT: 147 LBS | HEART RATE: 62 BPM | HEIGHT: 69 IN | BODY MASS INDEX: 21.77 KG/M2

## 2023-05-26 DIAGNOSIS — J34.2 DEVIATED NASAL SEPTUM: Primary | ICD-10-CM

## 2023-05-26 DIAGNOSIS — R05.8 OTHER COUGH: ICD-10-CM

## 2023-05-26 PROCEDURE — 3008F BODY MASS INDEX DOCD: CPT | Performed by: FAMILY MEDICINE

## 2023-05-26 PROCEDURE — 99213 OFFICE O/P EST LOW 20 MIN: CPT | Performed by: FAMILY MEDICINE

## 2023-05-26 PROCEDURE — 3078F DIAST BP <80 MM HG: CPT | Performed by: FAMILY MEDICINE

## 2023-05-26 PROCEDURE — 3074F SYST BP LT 130 MM HG: CPT | Performed by: FAMILY MEDICINE

## 2023-05-26 NOTE — PROGRESS NOTES
Blood pressure 96/56, pulse 62, height 5' 9\" (1.753 m), weight 147 lb (66.7 kg). Patient reports he has had a cough on and off for 7 years. History of deviated nasal septum. Some light green phlegm. No nocturnal cough no asthma smokes 2 cigarettes a day for 1 year. No sneezing no watery or itchy eyes. Has had a dog for years. No nasal congestion no bad breath. No ear pain no facial pressure. No heartburn no headache. Has not used anything over-the-counter.     Objective no clear nonerythematous ears with TMs intact no redness    Lungs clear to auscultation no rales rhonchi or wheezes    Assessment chronic cough    Plan referral to ENT for deviated septum and referral to allergist patient does spend a lot of time outside

## 2023-07-28 ENCOUNTER — OFFICE VISIT (OUTPATIENT)
Dept: OTOLARYNGOLOGY | Facility: CLINIC | Age: 28
End: 2023-07-28

## 2023-07-28 DIAGNOSIS — R05.3 CHRONIC COUGH: Primary | ICD-10-CM

## 2023-07-28 PROCEDURE — 99243 OFF/OP CNSLTJ NEW/EST LOW 30: CPT | Performed by: OTOLARYNGOLOGY

## 2023-07-28 PROCEDURE — 31575 DIAGNOSTIC LARYNGOSCOPY: CPT | Performed by: OTOLARYNGOLOGY

## 2023-07-28 RX ORDER — MONTELUKAST SODIUM 10 MG/1
10 TABLET ORAL NIGHTLY
Qty: 30 TABLET | Refills: 3 | Status: SHIPPED | OUTPATIENT
Start: 2023-07-28

## 2023-07-28 RX ORDER — AZELASTINE 1 MG/ML
2 SPRAY, METERED NASAL 2 TIMES DAILY
Qty: 30 ML | Refills: 0 | Status: SHIPPED | OUTPATIENT
Start: 2023-07-28

## 2023-07-28 NOTE — PROGRESS NOTES
Ramon Evans is a 32year old male. Patient presents with:  Throat Problem: Patient is here due to sore throat. Nose Problem: Patient is here due to deviated nasal septum, reports no issues with breathing. Cough: Patient is here due to chronic cough, x 7 years. Reports no issues swallowing. HISTORY OF PRESENT ILLNESS  7 years of an ongoing chronic cough that is generally not productive. No issues with swallowing or his voice. He does note that he has a deviated septum but does not have any real complaints about his breathing. Today he does have a sore throat but generally states that he does not have a sore throat associated with any of these other symptoms. Sent by Dr. Jose Almanza for my opinion regarding his cough issues. Social History    Socioeconomic History      Marital status: Single    Tobacco Use      Smoking status: Former      Smokeless tobacco: Former    Vaping Use      Vaping Use: Never used    Substance and Sexual Activity      Alcohol use: Yes        Comment: rarely      Drug use: No      Family History   Problem Relation Age of Onset    Heart Disorder Maternal Grandmother     Cancer Maternal Grandfather     Diabetes Maternal Grandfather     Depression Maternal Grandfather     Depression Paternal Grandfather     Anxiety Paternal Grandfather     Alcohol and Other Disorders Associated Paternal Grandfather        Past Medical History:   Diagnosis Date    Anxiety     Suicidal ideations        Past Surgical History:   Procedure Laterality Date    TONSILLECTOMY      VASECTOMY Bilateral 06/09/2022         REVIEW OF SYSTEMS    System Neg/Pos Details   Constitutional Negative Fatigue, fever and weight loss. ENMT Negative Drooling. Eyes Negative Blurred vision and vision changes. Respiratory Negative Dyspnea and wheezing. Cardio Negative Chest pain, irregular heartbeat/palpitations and syncope. GI Negative Abdominal pain and diarrhea.    Endocrine Negative Cold intolerance and heat intolerance. Neuro Negative Tremors. Psych Negative Anxiety and depression. Integumentary Negative Frequent skin infections, pigment change and rash. Hema/Lymph Negative Easy bleeding and easy bruising. PHYSICAL EXAM    There were no vitals taken for this visit. Constitutional Normal Overall appearance - Normal.   Psychiatric Normal Orientation - Oriented to time, place, person & situation. Appropriate mood and affect. Neck Exam Normal Inspection - Normal. Palpation - Normal. Parotid gland - Normal. Thyroid gland - Normal.   Eyes Normal Conjunctiva - Right: Normal, Left: Normal. Pupil - Right: Normal, Left: Normal. Fundus - Right: Normal, Left: Normal.   Neurological Normal Memory - Normal. Cranial nerves - Cranial nerves II through XII grossly intact. Head/Face Normal Facial features - Normal. Eyebrows - Normal. Skull - Normal.        Nasopharynx Normal External nose - Normal. Lips/teeth/gums - Normal. Tonsils - Normal. Oropharynx - Normal.   Ears Normal Inspection - Right: Normal, Left: Normal. Canal - Right: Normal, Left: Normal. TM - Right: Normal, Left: Normal.   Skin Normal Inspection - Normal.        Lymph Detail Normal Submental. Submandibular. Anterior cervical. Posterior cervical. Supraclavicular. Nose/Mouth/Throat Normal External nose - Normal. Lips/teeth/gums - Normal. Tonsils - Normal. Oropharynx - Normal.   Nose/Mouth/Throat Normal Nares - Right: Normal Left: Normal. Septum -deviated to the left turbinates - Right: Normal, Left: Normal.   Procedures:  Endoscopy/Laryngoscopy  Pre-Procedure Care: Verbal consent was obtained. Procedure/risks were explained. Questions were answered. Correct patient identified. Correct side and site confirmed. A topical spray of ). 25% Neosynephrine was sprayed into the nose. Laryngoscopy:  Flexible Fiberoptic Laryngoscopy: A diagnostic flexible fiberoptic laryngoscopy was performed.  The flexible fiberoptic laryngoscope was placed into the nose or mouthand advanced  into the interior of the larynx. A thorough examination of the interior of the larynx was performed. Findings were as follows. Hypopharynx/Larynx:  Epiglottis is normal.  Arytenoids:  Bilateral: Arytenoids are normal.  Vocal folds-false  Bilateral: Vocal folds (false) are normal.  Vocal folds-true  Bilateral: Vocal folds (true) are normal.  Pyriform sinus:  Bilateral: Pyriform sinuses are normal.  Base of tongue is normal in appearance. There is no airway obstruction. General comments: Significant postnasal discharge noted. Erythema of the laryngeal structures no nodules or polyps noted              Current Outpatient Medications:     montelukast 10 MG Oral Tab, Take 1 tablet (10 mg total) by mouth nightly., Disp: 30 tablet, Rfl: 3    loratadine-pseudoephedrine ER 5-120 MG Oral Tablet 12 Hr, Take 1 tablet by mouth every 12 (twelve) hours. , Disp: 60 tablet, Rfl: 3    azelastine 0.1 % Nasal Solution, 2 sprays by Nasal route 2 (two) times daily. , Disp: 30 mL, Rfl: 0    tadalafil 5 MG Oral Tab, Take 1 tablet (5 mg total) by mouth daily as needed for Erectile Dysfunction. , Disp: 30 tablet, Rfl: 0  ASSESSMENT AND PLAN    1. Chronic cough  Does have a deviated septum to the left. He does have significant postnasal discharge especially in the mornings. This could be associated with his chronic cough. I have asked him to start Claritin-D Singulair Astelin nasal spray for 1 month to see if he has resolution of his symptoms and if so we will discuss possible septoplasty and turbinate reduction when he returns in 1 month. - LARYNGOSCOPY,FLEX FIBER,DIAGNOSTIC        This note was prepared using [employer] voice recognition dictation software. As a result errors may occur. When identified these errors have been corrected. While every attempt is made to correct errors during dictation discrepancies may still exist    Josr Michael MD    7/28/2023    11:07 AM

## 2023-09-17 NOTE — TELEPHONE ENCOUNTER
Pt states norco was not covered by his insurance due to descrepancy in provider number. Called josie. States there was no NPI ,but issue was fixed. Med is covered. Called pt and notified him that issue was resolved and medication is covered. none

## (undated) NOTE — LETTER
Saray Ramos  [address],  [address]       07/28/23        Patient: Ligia Bowers   YOB: 1995   Date of Visit: 7/28/2023       Dear  Dr. Chica Javier,      Thank you for referring Ligia Bowers to my practice. Please find my assessment and plan below. ASSESSMENT AND PLAN    1. Chronic cough  Does have a deviated septum to the left. He does have significant postnasal discharge especially in the mornings. This could be associated with his chronic cough. I have asked him to start Claritin-D Singulair Astelin nasal spray for 1 month to see if he has resolution of his symptoms and if so we will discuss possible septoplasty and turbinate reduction when he returns in 1 month. - LARYNGOSCOPY,FLEX FIBER,DIAGNOSTIC             Sincerely,   Alli Villegas. Lois Michael MD   [address] , [place]  [address] [facility]  [address] [address]    Document electronically generated by:  Alli Villegas.  Lois Michael MD

## (undated) NOTE — LETTER
Date & Time: 8/27/2018, 10:27 AM  Patient: Effie Games  Encounter Provider(s):    Dyana Avalos MD       To Whom It May Concern:    Steven Smith was seen and treated in our department on 8/27/2018.  He can return to work with these limitations: A

## (undated) NOTE — LETTER
Tyler Humphreys  [facility] [address] [facility], [address]       06/11/18        Patient: Donny Young   YOB: 1995   Date of Visit: 6/11/2018       Dear  Dr. Princess Strong,      Thank you for referring Donny Young to my p

## (undated) NOTE — LETTER
12/13/2018              [id number]        [place] [address]         Dear Jacquie Nicholas,  [employer] THAT THE HYDROCELE IS INSIGNIFICANT CLINICALLY AND THAT IT WAS DIAGNOSED A YEAR AGO AND IT DOES NOT REQUIRE TREATMENT